# Patient Record
Sex: FEMALE | Race: WHITE | NOT HISPANIC OR LATINO | Employment: STUDENT | URBAN - METROPOLITAN AREA
[De-identification: names, ages, dates, MRNs, and addresses within clinical notes are randomized per-mention and may not be internally consistent; named-entity substitution may affect disease eponyms.]

---
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
## 2024-07-03 ENCOUNTER — OFFICE VISIT (OUTPATIENT)
Dept: INTERNAL MEDICINE | Facility: CLINIC | Age: 19
End: 2024-07-03
Payer: COMMERCIAL

## 2024-07-03 ENCOUNTER — PATIENT ROUNDING (BHMG ONLY) (OUTPATIENT)
Dept: INTERNAL MEDICINE | Facility: CLINIC | Age: 19
End: 2024-07-03
Payer: COMMERCIAL

## 2024-07-03 VITALS
BODY MASS INDEX: 24.6 KG/M2 | WEIGHT: 130.3 LBS | DIASTOLIC BLOOD PRESSURE: 60 MMHG | OXYGEN SATURATION: 98 % | TEMPERATURE: 98.3 F | SYSTOLIC BLOOD PRESSURE: 98 MMHG | HEART RATE: 108 BPM | HEIGHT: 61 IN

## 2024-07-03 DIAGNOSIS — K59.09 CHRONIC CONSTIPATION: ICD-10-CM

## 2024-07-03 DIAGNOSIS — Z00.00 HEALTHCARE MAINTENANCE: Primary | ICD-10-CM

## 2024-07-03 DIAGNOSIS — K21.9 GASTROESOPHAGEAL REFLUX DISEASE, UNSPECIFIED WHETHER ESOPHAGITIS PRESENT: ICD-10-CM

## 2024-07-03 PROCEDURE — 99385 PREV VISIT NEW AGE 18-39: CPT | Performed by: NURSE PRACTITIONER

## 2024-07-03 RX ORDER — CETIRIZINE HYDROCHLORIDE 10 MG/1
10 TABLET ORAL DAILY
COMMUNITY

## 2024-07-03 RX ORDER — MULTIVIT WITH MINERALS/LUTEIN
250 TABLET ORAL DAILY
COMMUNITY

## 2024-07-03 RX ORDER — NORETHINDRONE ACETATE AND ETHINYL ESTRADIOL AND FERROUS FUMARATE 1MG-20(21)
KIT ORAL
COMMUNITY
Start: 2024-06-18

## 2024-07-03 NOTE — PROGRESS NOTES
July 3, 2024    PATIENT ROUNDING OBTAINED AT CHECKOUT.  PATIENT'S MOTHER IS A PATIENT HERE IN [place] OFFICE, SO SHE REFERRED HER DAUGHTER HERE AS WELL.     SHE HAD NO DIFFICULTIES MAKING HER NEW PATIENT APPOINTMENT.  SHE HAD A GOOD EXPERIENCE IN THE OFFICE TODAY FROM THE TIME SHE CHECKED IN UNTIL THE TIME SHE CHECKED OUT, AND COULD THINK OF NO RECOMMENDATIONS THAT WOULD HAVE MADE HER EXPERIENCE ANY BETTER TODAY.      SHE WOULD RECOMMEND OUR OFFICE TO FAMILY AND FRIENDS.

## 2024-07-03 NOTE — PROGRESS NOTES
Subjective   Constanza Martinez is a 19 y.o. female.     Chief Complaint   Patient presents with    Constipation     Pt c/o chronic constipation.    Annual Exam     Pt is here as a new pt to est care.    Heartburn     Pt c/o heartburn.        History of Present Illness   She is here today as a new patient to establish care and for CPE. She feels like her overall health is good. She eats a healthy diet and exercises regularly. She is currently at [facility] at [facility].   Previous PCP was her pediatrician.     Chronic constipation-since childhood. She underwent imaging as a child. She notes bowel movements are typically every 3-7 days, hard requiring frequent straining.  She notes she typically will have to take Dulcolax to successfully have a BM. She notes abdominal bloating.  She denies any acute abdominal pain, BRBPR, melena.    Acid reflux- she notes acid reflux over the past 2-3 days. She denies any trigger foods, but does note that she has been eating a lot of marinara sauce since being home from college. She has not tried anything for symptoms. She denies any dysphagia or odynophagia.     GYN-she is followed by gynecology, Sweta Chin at [facility].  She is currently on OCP.  She is currently sexually active, monogamous with her male partner.    The following portions of the patient's history were reviewed and updated as appropriate: allergies, current medications, past family history, past medical history, past social history, past surgical history, and problem list.    Review of Systems   Constitutional: Negative.    HENT: Negative.     Eyes: Negative.    Respiratory: Negative.     Cardiovascular: Negative.    Gastrointestinal:  Positive for abdominal distention, constipation and indigestion. Negative for abdominal pain, anal bleeding, blood in stool, diarrhea, nausea and vomiting.   Endocrine: Negative.    Genitourinary: Negative.    Musculoskeletal: Negative.    Skin: Negative.     Allergic/Immunologic: Positive for environmental allergies.   Neurological: Negative.    Hematological: Negative.    Psychiatric/Behavioral: Negative.         Objective   Physical Exam  Constitutional:       Appearance: Normal appearance. She is well-developed.   HENT:      Head: Normocephalic and atraumatic.      Right Ear: Hearing, ear canal and external ear normal. Tympanic membrane is scarred.      Left Ear: Hearing, ear canal and external ear normal. Tympanic membrane is scarred.      Nose: Nose normal.      Right Sinus: No maxillary sinus tenderness or frontal sinus tenderness.      Left Sinus: No maxillary sinus tenderness or frontal sinus tenderness.      Mouth/Throat:      Lips: Pink.      Mouth: Mucous membranes are moist.      Dentition: Normal dentition.      Tongue: No lesions.      Pharynx: Oropharynx is clear. Uvula midline.      Tonsils: No tonsillar exudate.   Eyes:      General: Lids are normal.      Extraocular Movements: Extraocular movements intact.      Conjunctiva/sclera: Conjunctivae normal.      Pupils: Pupils are equal, round, and reactive to light.   Neck:      Thyroid: No thyroid mass, thyromegaly or thyroid tenderness.      Vascular: No carotid bruit.      Trachea: Trachea normal.   Cardiovascular:      Rate and Rhythm: Normal rate and regular rhythm.      Pulses: Normal pulses.           Radial pulses are 2+ on the right side and 2+ on the left side.        Popliteal pulses are 2+ on the right side and 2+ on the left side.        Dorsalis pedis pulses are 2+ on the right side and 2+ on the left side.        Posterior tibial pulses are 2+ on the right side and 2+ on the left side.      Heart sounds: S1 normal and S2 normal.   Pulmonary:      Effort: Pulmonary effort is normal.      Breath sounds: Normal breath sounds.   Abdominal:      General: Bowel sounds are normal. There is no distension or abdominal bruit.      Palpations: Abdomen is soft. There is no hepatomegaly or splenomegaly.       Tenderness: There is no abdominal tenderness.      Hernia: No hernia is present.   Musculoskeletal:      Cervical back: Normal range of motion and neck supple.      Right lower leg: No edema.      Left lower leg: No edema.   Lymphadenopathy:      Head:      Right side of head: No submental, submandibular, tonsillar or occipital adenopathy.      Left side of head: No submental, submandibular, tonsillar or occipital adenopathy.      Cervical: No cervical adenopathy.      Upper Body:      Right upper body: No supraclavicular adenopathy.      Left upper body: No supraclavicular adenopathy.      Lower Body: No right inguinal adenopathy. No left inguinal adenopathy.   Skin:     General: Skin is warm and dry.      Findings: No rash.      Nails: There is no clubbing.   Neurological:      General: No focal deficit present.      Mental Status: She is alert and oriented to person, place, and time.      Cranial Nerves: Cranial nerves 2-12 are intact.      Sensory: Sensation is intact.      Motor: Motor function is intact.      Coordination: Coordination is intact.      Gait: Gait is intact.      Deep Tendon Reflexes:      Reflex Scores:       Patellar reflexes are 2+ on the right side and 2+ on the left side.  Psychiatric:         Attention and Perception: Attention and perception normal.         Mood and Affect: Mood and affect normal.         Speech: Speech normal.         Behavior: Behavior normal. Behavior is cooperative.         Thought Content: Thought content normal.         Cognition and Memory: Cognition and memory normal.         Judgment: Judgment normal.         Vitals:    07/03/24 1434   BP: 98/60   Pulse: 108   Temp: 98.3 °F (36.8 °C)   SpO2: 98%      Body mass index is 24.62 kg/m².    Assessment & Plan   Problems Addressed this Visit    None  Visit Diagnoses       Healthcare maintenance    -  Primary    Relevant Orders    CBC & Differential    Comprehensive Metabolic Panel    Hepatitis C Antibody    Lipid  Panel With LDL / HDL Ratio    TSH Rfx On Abnormal To Free T4    Vitamin D,25-Hydroxy    Chronic constipation        Relevant Orders    CBC & Differential    Comprehensive Metabolic Panel    TSH Rfx On Abnormal To Free T4    Gastroesophageal reflux disease, unspecified whether esophagitis present        Relevant Orders    CBC & Differential    Comprehensive Metabolic Panel          Diagnoses         Codes Comments    Healthcare maintenance    -  Primary ICD-10-CM: Z00.00  ICD-9-CM: V70.0     Chronic constipation     ICD-10-CM: K59.09  ICD-9-CM: 564.00     Gastroesophageal reflux disease, unspecified whether esophagitis present     ICD-10-CM: K21.9  ICD-9-CM: 530.81           1.  Preventative counseling-encouraged her to continue healthy, balanced eating and regular exercise.  Ensure adequate dietary intake of calcium and vitamin D.  2.  Chronic constipation-recommend starting an oral fiber supplement daily and continuing to hydrate well with fluids.  Recommend as needed MiraLAX and Colace.  Discussed with her that if symptoms or not improving with oral fiber supplement recommend starting Linzess.  If symptoms persist recommend referral to GI.  3.  GERD-likely related to increase in acidic foods.  Recommend starting famotidine 20 mg twice daily for 2 to 3 weeks along with reflux precautions.  Notify for worsening symptoms.  “Discussed risks/benefits to vaccination, reviewed components of the vaccine, discussed VIS, discussed informed consent, informed consent obtained. Patient/Parent was allowed to accept or refuse vaccine. Questions answered to satisfactory state of patient/Parent. We reviewed typical age appropriate and seasonally appropriate vaccinations. Reviewed immunization history and updated state vaccination form as needed. Patient was counseled on HPV  Tdap    Encourage routine dental and eye exam  GYN up-to-date  Encouraged sunscreen use outside    Fasting labs next week, will call with results.  Follow-up  pending lab results and response to treatment discussed today.

## 2025-01-24 ENCOUNTER — OFFICE VISIT (OUTPATIENT)
Dept: INTERNAL MEDICINE | Facility: CLINIC | Age: 20
End: 2025-01-24
Payer: COMMERCIAL

## 2025-01-24 VITALS
HEIGHT: 61 IN | OXYGEN SATURATION: 98 % | TEMPERATURE: 98.2 F | SYSTOLIC BLOOD PRESSURE: 112 MMHG | HEART RATE: 92 BPM | WEIGHT: 137.6 LBS | DIASTOLIC BLOOD PRESSURE: 68 MMHG | BODY MASS INDEX: 25.98 KG/M2

## 2025-01-24 DIAGNOSIS — J34.89 SINUS PRESSURE: ICD-10-CM

## 2025-01-24 DIAGNOSIS — J01.00 ACUTE NON-RECURRENT MAXILLARY SINUSITIS: Primary | ICD-10-CM

## 2025-01-24 DIAGNOSIS — R05.1 ACUTE COUGH: ICD-10-CM

## 2025-01-24 DIAGNOSIS — M54.32 SCIATICA OF LEFT SIDE: ICD-10-CM

## 2025-01-24 LAB
EXPIRATION DATE: NORMAL
FLUAV AG UPPER RESP QL IA.RAPID: NOT DETECTED
FLUBV AG UPPER RESP QL IA.RAPID: NOT DETECTED
INTERNAL CONTROL: NORMAL
Lab: NORMAL
SARS-COV-2 AG UPPER RESP QL IA.RAPID: NOT DETECTED

## 2025-01-24 PROCEDURE — 99214 OFFICE O/P EST MOD 30 MIN: CPT | Performed by: NURSE PRACTITIONER

## 2025-01-24 PROCEDURE — 87428 SARSCOV & INF VIR A&B AG IA: CPT | Performed by: NURSE PRACTITIONER

## 2025-01-24 RX ORDER — AMOXICILLIN 875 MG/1
875 TABLET, COATED ORAL 2 TIMES DAILY
Qty: 20 TABLET | Refills: 0 | Status: SHIPPED | OUTPATIENT
Start: 2025-01-24

## 2025-01-24 NOTE — PROGRESS NOTES
Subjective   Constanza Martinez is a 19 y.o. female. Patient is here today for   Chief Complaint   Patient presents with    Sinus Problem     Sinus pressure; symptoms started 6-7 days ago    Cough   .    History of Present Illness   C/o nasal congestion x 6 days associated with chest congestion, cough, sinus pressure. She has taken emergen-C, flonase with minimal relief.     She is also here to follow up from an ER visit. She was having some back pain with pain and numbness down her left leg. She is feeling a little better after taking prednisone. She had been sitting around more recently since being home from college.     The following portions of the patient's history were reviewed and updated as appropriate: allergies, current medications, past family history, past medical history, past social history, past surgical history and problem list.    Review of Systems    Objective   Vitals:    01/24/25 0932   BP: 112/68   Pulse: 92   Temp: 98.2 °F (36.8 °C)   SpO2: 98%     Body mass index is 26.01 kg/m².  Physical Exam  Vitals and nursing note reviewed.   Constitutional:       Appearance: Normal appearance. She is well-developed.   HENT:      Right Ear: A middle ear effusion is present. Tympanic membrane is scarred and erythematous.      Left Ear: A middle ear effusion is present. Tympanic membrane is scarred.      Mouth/Throat:      Pharynx: Oropharynx is clear.   Cardiovascular:      Rate and Rhythm: Normal rate and regular rhythm.      Heart sounds: Normal heart sounds.   Pulmonary:      Effort: Pulmonary effort is normal.      Breath sounds: Normal breath sounds.   Musculoskeletal:      Lumbar back: Tenderness present.        Back:    Lymphadenopathy:      Cervical: Cervical adenopathy present.   Skin:     General: Skin is warm and dry.   Neurological:      Mental Status: She is alert and oriented to person, place, and time.   Psychiatric:         Speech: Speech normal.         Behavior: Behavior normal.          Thought Content: Thought content normal.     Results for orders placed or performed in visit on 01/24/25   POCT SARS-CoV-2 Antigen PETER + Flu    Collection Time: 01/24/25 10:11 AM    Specimen: Swab   Result Value Ref Range    SARS Antigen Not Detected Not Detected, Presumptive Negative    Influenza A Antigen PETER Not Detected Not Detected    Influenza B Antigen PETER Not Detected Not Detected    Internal Control Passed Passed    Lot Number [id number]     Expiration Date 10/11/25      Reviewed lumbar spine x-ray    XR SPINE LUMBAR COMPLETE 4+VW-     DATE OF EXAM: 1/6/2025 5:05 PM     INDICATION: pain and tingling in lower back going down legs.     COMPARISON: None available.     TECHNIQUE: 5 views of the lumbar spine were obtained.     FINDINGS:  The lumbar vertebral bodies demonstrate fairly well preserved height and  alignment. No evidence of acute fracture or subluxation of the lumbar  spine. Mild to moderate intervertebral disc space narrowing at L5-S1.  The intervertebral disc spaces are otherwise fairly well-maintained. The  SI joints are unremarkable.     IMPRESSION:  Mild to moderate disc space narrowing at L5-S1, without radiographic  evidence of acute fracture or subluxation of the lumbar spine.     This report was finalized on 1/6/2025 5:45 PM by Bob Hensley MD on  Workstation: [id number]    Assessment & Plan   Diagnoses and all orders for this visit:    1. Acute non-recurrent maxillary sinusitis (Primary)  -     amoxicillin (AMOXIL) 875 MG tablet; Take 1 tablet by mouth 2 (Two) Times a Day.  Dispense: 20 tablet; Refill: 0    2. Acute cough  -     POCT SARS-CoV-2 Antigen PETER + Flu    3. Sinus pressure  -     POCT SARS-CoV-2 Antigen PETER + Flu    4. Sciatica of left side      Sinusitis - will treat with amoxicillin.     Sciatica - patient was given a handout on stretches that she can do. If her pain continues, she may need physical therapy. Patient is returning to college in [place] this weekend.

## 2025-01-27 RX ORDER — ERGOCALCIFEROL 1.25 MG/1
50000 CAPSULE, LIQUID FILLED ORAL WEEKLY
Qty: 12 CAPSULE | Refills: 0 | Status: SHIPPED | OUTPATIENT
Start: 2025-01-27

## 2025-04-17 RX ORDER — ERGOCALCIFEROL 1.25 MG/1
50000 CAPSULE, LIQUID FILLED ORAL WEEKLY
Qty: 12 CAPSULE | Refills: 0 | OUTPATIENT
Start: 2025-04-17